# Patient Record
Sex: MALE | Race: AMERICAN INDIAN OR ALASKA NATIVE | URBAN - METROPOLITAN AREA
[De-identification: names, ages, dates, MRNs, and addresses within clinical notes are randomized per-mention and may not be internally consistent; named-entity substitution may affect disease eponyms.]

---

## 2024-09-05 ENCOUNTER — APPOINTMENT (OUTPATIENT)
Dept: URBAN - METROPOLITAN AREA CLINIC 233 | Age: 39
Setting detail: DERMATOLOGY
End: 2024-09-05

## 2024-09-05 DIAGNOSIS — L30.8 OTHER SPECIFIED DERMATITIS: ICD-10-CM

## 2024-09-05 DIAGNOSIS — L98429 CHRONIC ULCER OF OTHER SPECIFIED SITES: ICD-10-CM

## 2024-09-05 DIAGNOSIS — L98419 CHRONIC ULCER OF OTHER SPECIFIED SITES: ICD-10-CM

## 2024-09-05 PROBLEM — L98.499 NON-PRESSURE CHRONIC ULCER OF SKIN OF OTHER SITES WITH UNSPECIFIED SEVERITY: Status: ACTIVE | Noted: 2024-09-05

## 2024-09-05 PROCEDURE — OTHER PRESCRIPTION: OTHER

## 2024-09-05 PROCEDURE — OTHER ORDER TESTS: OTHER

## 2024-09-05 PROCEDURE — 99203 OFFICE O/P NEW LOW 30 MIN: CPT

## 2024-09-05 PROCEDURE — OTHER MIPS QUALITY: OTHER

## 2024-09-05 PROCEDURE — OTHER COUNSELING: OTHER

## 2024-09-05 PROCEDURE — OTHER PHOTO-DOCUMENTATION: OTHER

## 2024-09-05 PROCEDURE — OTHER ADDITIONAL NOTES: OTHER

## 2024-09-05 PROCEDURE — OTHER COUNSELING: TOPICAL STEROIDS: OTHER

## 2024-09-05 RX ORDER — CLOBETASOL PROPIONATE 0.5 MG/G
CREAM TOPICAL BID
Qty: 60 | Refills: 2 | Status: ERX | COMMUNITY
Start: 2024-09-05

## 2024-09-05 RX ORDER — CEPHALEXIN 500 MG/1
CAPSULE ORAL
Qty: 21 | Refills: 0 | Status: ERX | COMMUNITY
Start: 2024-09-05

## 2024-09-05 RX ORDER — MUPIROCIN 20 MG/G
OINTMENT TOPICAL
Qty: 1 | Refills: 0 | Status: ERX | COMMUNITY
Start: 2024-09-05

## 2024-09-05 RX ORDER — AMMONIUM LACTATE 12 %
LOTION (GRAM) TOPICAL
Qty: 226 | Refills: 1 | Status: ERX | COMMUNITY
Start: 2024-09-05

## 2024-09-05 ASSESSMENT — LOCATION DETAILED DESCRIPTION DERM
LOCATION DETAILED: LEFT MIDDLE FINGERTIP
LOCATION DETAILED: RIGHT MIDDLE FINGERTIP
LOCATION DETAILED: RIGHT INFERIOR FOREHEAD
LOCATION DETAILED: RIGHT FOREHEAD
LOCATION DETAILED: RIGHT INFERIOR FOREHEAD

## 2024-09-05 ASSESSMENT — LOCATION SIMPLE DESCRIPTION DERM
LOCATION SIMPLE: RIGHT MIDDLE FINGER
LOCATION SIMPLE: RIGHT FOREHEAD
LOCATION SIMPLE: LEFT MIDDLE FINGER
LOCATION SIMPLE: RIGHT FOREHEAD

## 2024-09-05 ASSESSMENT — LOCATION ZONE DERM
LOCATION ZONE: FINGER
LOCATION ZONE: FACE
LOCATION ZONE: FACE

## 2024-09-05 NOTE — PROCEDURE: ORDER TESTS
Performing Laboratory: [id number]
Bill For Surgical Tray: no
Billing Type: Third-Party Bill
Expected Date Of Service: 09/05/2024

## 2024-09-05 NOTE — PROCEDURE: ADDITIONAL NOTES
Additional Notes: Return in 1 week if not improved. Consider BX at next visit.
Detail Level: Simple
Render Risk Assessment In Note?: no

## 2024-09-12 ENCOUNTER — APPOINTMENT (OUTPATIENT)
Dept: URBAN - METROPOLITAN AREA CLINIC 233 | Age: 39
Setting detail: DERMATOLOGY
End: 2024-09-30

## 2024-09-12 DIAGNOSIS — L98419 CHRONIC ULCER OF OTHER SPECIFIED SITES: ICD-10-CM

## 2024-09-12 DIAGNOSIS — L98429 CHRONIC ULCER OF OTHER SPECIFIED SITES: ICD-10-CM

## 2024-09-12 PROBLEM — L98.499 NON-PRESSURE CHRONIC ULCER OF SKIN OF OTHER SITES WITH UNSPECIFIED SEVERITY: Status: ACTIVE | Noted: 2024-09-12

## 2024-09-12 PROCEDURE — OTHER COUNSELING: OTHER

## 2024-09-12 PROCEDURE — OTHER MIPS QUALITY: OTHER

## 2024-09-12 PROCEDURE — OTHER PRESCRIPTION: OTHER

## 2024-09-12 PROCEDURE — OTHER PHOTO-DOCUMENTATION: OTHER

## 2024-09-12 PROCEDURE — 99213 OFFICE O/P EST LOW 20 MIN: CPT

## 2024-09-12 PROCEDURE — OTHER ADDITIONAL NOTES: OTHER

## 2024-09-12 RX ORDER — MUPIROCIN 20 MG/G
OINTMENT TOPICAL
Qty: 1 | Refills: 0 | Status: ERX

## 2024-09-12 RX ORDER — CEPHALEXIN 500 MG/1
CAPSULE ORAL
Qty: 42 | Refills: 0 | Status: ERX

## 2024-09-12 ASSESSMENT — LOCATION DETAILED DESCRIPTION DERM
LOCATION DETAILED: RIGHT FOREHEAD
LOCATION DETAILED: RIGHT INFERIOR FOREHEAD

## 2024-09-12 ASSESSMENT — LOCATION SIMPLE DESCRIPTION DERM: LOCATION SIMPLE: RIGHT FOREHEAD

## 2024-09-12 ASSESSMENT — LOCATION ZONE DERM: LOCATION ZONE: FACE

## 2024-09-12 NOTE — PROCEDURE: ADDITIONAL NOTES
Additional Notes: Return in 2 weeks. Consider BX at next visit.
Detail Level: Simple
Render Risk Assessment In Note?: no

## 2024-09-26 ENCOUNTER — APPOINTMENT (OUTPATIENT)
Dept: URBAN - METROPOLITAN AREA CLINIC 233 | Age: 39
Setting detail: DERMATOLOGY
End: 2024-09-26

## 2024-09-26 DIAGNOSIS — L98419 CHRONIC ULCER OF OTHER SPECIFIED SITES: ICD-10-CM

## 2024-09-26 DIAGNOSIS — D485 NEOPLASM OF UNCERTAIN BEHAVIOR OF SKIN: ICD-10-CM

## 2024-09-26 DIAGNOSIS — L98429 CHRONIC ULCER OF OTHER SPECIFIED SITES: ICD-10-CM

## 2024-09-26 PROBLEM — L98.491 NON-PRESSURE CHRONIC ULCER OF SKIN OF OTHER SITES LIMITED TO BREAKDOWN OF SKIN: Status: ACTIVE | Noted: 2024-09-26

## 2024-09-26 PROBLEM — D48.5 NEOPLASM OF UNCERTAIN BEHAVIOR OF SKIN: Status: ACTIVE | Noted: 2024-09-26

## 2024-09-26 PROCEDURE — OTHER PUNCH BIOPSY FOR TISSUE CULTURE: OTHER

## 2024-09-26 PROCEDURE — OTHER MIPS QUALITY: OTHER

## 2024-09-26 PROCEDURE — OTHER ORDER TESTS: OTHER

## 2024-09-26 PROCEDURE — OTHER BIOPSY BY PUNCH METHOD: OTHER

## 2024-09-26 PROCEDURE — 11104 PUNCH BX SKIN SINGLE LESION: CPT

## 2024-09-26 PROCEDURE — OTHER PRESCRIPTION: OTHER

## 2024-09-26 PROCEDURE — 11105 PUNCH BX SKIN EA SEP/ADDL: CPT

## 2024-09-26 RX ORDER — CEPHALEXIN 500 MG/1
CAPSULE ORAL
Qty: 42 | Refills: 0 | Status: ERX

## 2024-09-26 ASSESSMENT — LOCATION DETAILED DESCRIPTION DERM: LOCATION DETAILED: RIGHT FOREHEAD

## 2024-09-26 ASSESSMENT — LOCATION SIMPLE DESCRIPTION DERM: LOCATION SIMPLE: RIGHT FOREHEAD

## 2024-09-26 ASSESSMENT — LOCATION ZONE DERM: LOCATION ZONE: FACE

## 2024-09-26 NOTE — PROCEDURE: ORDER TESTS
Performing Laboratory: -[id number]
Bill For Surgical Tray: no
Billing Type: Third-Party Bill
Expected Date Of Service: 09/26/2024

## 2024-09-26 NOTE — PROCEDURE: BIOPSY BY PUNCH METHOD

## 2024-10-02 ENCOUNTER — APPOINTMENT (OUTPATIENT)
Dept: URBAN - METROPOLITAN AREA CLINIC 233 | Age: 39
Setting detail: DERMATOLOGY
End: 2024-10-02

## 2024-10-02 DIAGNOSIS — L98429 CHRONIC ULCER OF OTHER SPECIFIED SITES: ICD-10-CM

## 2024-10-02 DIAGNOSIS — L98419 CHRONIC ULCER OF OTHER SPECIFIED SITES: ICD-10-CM

## 2024-10-02 PROBLEM — L98.499 NON-PRESSURE CHRONIC ULCER OF SKIN OF OTHER SITES WITH UNSPECIFIED SEVERITY: Status: ACTIVE | Noted: 2024-10-02

## 2024-10-02 PROCEDURE — OTHER ORDER TESTS: OTHER

## 2024-10-03 ENCOUNTER — APPOINTMENT (OUTPATIENT)
Dept: URBAN - METROPOLITAN AREA CLINIC 233 | Age: 39
Setting detail: DERMATOLOGY
End: 2024-10-03

## 2024-10-03 ENCOUNTER — RX ONLY (RX ONLY)
Age: 39
End: 2024-10-03

## 2024-10-03 DIAGNOSIS — Z48.02 ENCOUNTER FOR REMOVAL OF SUTURES: ICD-10-CM

## 2024-10-03 PROCEDURE — OTHER SUTURE REMOVAL (GLOBAL PERIOD): OTHER

## 2024-10-03 PROCEDURE — 99024 POSTOP FOLLOW-UP VISIT: CPT

## 2024-10-03 RX ORDER — MUPIROCIN 20 MG/G
OINTMENT TOPICAL
Qty: 15 | Refills: 0 | Status: ERX

## 2024-10-03 ASSESSMENT — LOCATION DETAILED DESCRIPTION DERM: LOCATION DETAILED: RIGHT INFERIOR FOREHEAD

## 2024-10-03 ASSESSMENT — LOCATION ZONE DERM: LOCATION ZONE: FACE

## 2024-10-03 ASSESSMENT — LOCATION SIMPLE DESCRIPTION DERM: LOCATION SIMPLE: RIGHT FOREHEAD

## 2024-10-03 NOTE — PROCEDURE: SUTURE REMOVAL (GLOBAL PERIOD)
Detail Level: Detailed
Add 44878 Cpt? (Important Note: In 2017 The Use Of 26680 Is Being Tracked By Cms To Determine Future Global Period Reimbursement For Global Periods): yes

## 2024-10-14 ENCOUNTER — APPOINTMENT (OUTPATIENT)
Dept: URBAN - METROPOLITAN AREA CLINIC 233 | Age: 39
Setting detail: DERMATOLOGY
End: 2024-10-14

## 2024-11-14 ENCOUNTER — APPOINTMENT (OUTPATIENT)
Dept: URBAN - METROPOLITAN AREA CLINIC 233 | Age: 39
Setting detail: DERMATOLOGY
End: 2024-11-14

## 2024-11-14 DIAGNOSIS — L90.5 SCAR CONDITIONS AND FIBROSIS OF SKIN: ICD-10-CM

## 2024-11-14 PROCEDURE — OTHER ADDITIONAL NOTES: OTHER

## 2024-11-14 PROCEDURE — OTHER PHOTO-DOCUMENTATION: OTHER

## 2024-11-14 PROCEDURE — 99212 OFFICE O/P EST SF 10 MIN: CPT

## 2024-11-14 ASSESSMENT — LOCATION ZONE DERM: LOCATION ZONE: FACE

## 2024-11-14 ASSESSMENT — LOCATION SIMPLE DESCRIPTION DERM: LOCATION SIMPLE: RIGHT FOREHEAD

## 2024-11-14 ASSESSMENT — LOCATION DETAILED DESCRIPTION DERM: LOCATION DETAILED: RIGHT INFERIOR FOREHEAD

## 2024-11-14 NOTE — PROCEDURE: ADDITIONAL NOTES
Additional Notes: Discussed seeing [facility] for micro needling to minimize appearance of scar. Also talked about trying silicone gel or sheets over the counter.
Render Risk Assessment In Note?: no
Detail Level: Simple